# Patient Record
Sex: MALE | Race: WHITE | NOT HISPANIC OR LATINO | Employment: FULL TIME | URBAN - METROPOLITAN AREA
[De-identification: names, ages, dates, MRNs, and addresses within clinical notes are randomized per-mention and may not be internally consistent; named-entity substitution may affect disease eponyms.]

---

## 2020-05-15 ENCOUNTER — OFFICE VISIT (OUTPATIENT)
Dept: FAMILY MEDICINE CLINIC | Facility: CLINIC | Age: 20
End: 2020-05-15

## 2020-05-15 VITALS
SYSTOLIC BLOOD PRESSURE: 132 MMHG | HEIGHT: 72 IN | HEART RATE: 84 BPM | WEIGHT: 315 LBS | BODY MASS INDEX: 42.66 KG/M2 | DIASTOLIC BLOOD PRESSURE: 92 MMHG | RESPIRATION RATE: 20 BRPM | TEMPERATURE: 99.4 F

## 2020-05-15 DIAGNOSIS — R40.0 HAS DAYTIME DROWSINESS: ICD-10-CM

## 2020-05-15 DIAGNOSIS — E66.01 CLASS 3 SEVERE OBESITY DUE TO EXCESS CALORIES WITH BODY MASS INDEX (BMI) OF 50.0 TO 59.9 IN ADULT, UNSPECIFIED WHETHER SERIOUS COMORBIDITY PRESENT (HCC): ICD-10-CM

## 2020-05-15 DIAGNOSIS — Z13.6 ENCOUNTER FOR LIPID SCREENING FOR CARDIOVASCULAR DISEASE: ICD-10-CM

## 2020-05-15 DIAGNOSIS — Z13.220 ENCOUNTER FOR LIPID SCREENING FOR CARDIOVASCULAR DISEASE: ICD-10-CM

## 2020-05-15 DIAGNOSIS — R06.81 WITNESSED APNEIC SPELLS: ICD-10-CM

## 2020-05-15 DIAGNOSIS — R53.83 OTHER FATIGUE: ICD-10-CM

## 2020-05-15 DIAGNOSIS — R06.83 SNORING: Primary | ICD-10-CM

## 2020-05-15 DIAGNOSIS — R63.4 WEIGHT LOSS: ICD-10-CM

## 2020-05-15 PROCEDURE — 99203 OFFICE O/P NEW LOW 30 MIN: CPT | Performed by: FAMILY MEDICINE

## 2020-05-15 NOTE — PROGRESS NOTES
Assessment/Plan       Problems Addressed this Visit     None      Visit Diagnoses     Snoring    -  Primary    Relevant Orders    Home Sleep Study    Has daytime drowsiness        Relevant Orders    Home Sleep Study    Witnessed apneic spells        Relevant Orders    Home Sleep Study    Other fatigue        Relevant Orders    CBC & Differential    Weight loss        Relevant Orders    CBC & Differential    Hemoglobin A1c    Class 3 severe obesity due to excess calories with body mass index (BMI) of 50.0 to 59.9 in adult, unspecified whether serious comorbidity present (CMS/[facility])        Relevant Orders    Comprehensive Metabolic Panel    Lipid Panel With / Chol / HDL Ratio    Hemoglobin A1c    Encounter for lipid screening for cardiovascular disease        Relevant Orders    Comprehensive Metabolic Panel    Lipid Panel With / Chol / HDL Ratio            Follow up: Return if symptoms worsen or fail to improve.     DISCUSSION  New patient here for evaluation.  Has chronic problems which may be due to obstructive sleep apnea.  Reports snoring, daytime drowsiness and has had witnessed apneic episodes.  Recommend sleep study.    Weight loss with obesity.  Has been trying to lose weight but at great risk for diabetes.  Check A1c and CMP.    We will also check lipids for screening.    Mild elevation of blood pressure may be due to sleep apnea or obesity.  Check sleep study.  If blood pressure remains elevated, may need treatment.          MEDICATIONS PRESCRIBED  Requested Prescriptions      No prescriptions requested or ordered in this encounter            -------------------------------------------    Subjective     Chief Complaint   Patient presents with   • Establish Care     mom wanted him to be seen. Seen eye MD and something going on with Left eye they think related to sleep apnea? or having to wear contacts too long.    • check for diabetes     Checkup         History of Present Illness    Weight  "loss  Hyperglycemia?  ? DM   Needs to be checked  Losing weight on purpose  Has decreased portion size  FH: mom + DM and dad prediabetic  No urine at night  Some thirst  Not always hungry    High Blood pressure  Has been igh at MD office before  No meds for BP  FH: Mom + HTN    No chest pain   No shortness of breath  No headaches  Some swelling ankles if long walk      Snoring  Fiancé has said he stops breathing  + tired   No naps during the day    Occ fal asleep as passenger  No sleep driving    Wake up : if flat, + tired    Left eye: some blood vessels , ? Not enough oxygen. Either sleep apnea or over wear contacts  Takes out at night now to see if helps        Concerned about DM and high blood pressure      Previous ADD  Off meds since age 17    Denies depression  Last few days, some feeling down    Sons   before delivery. (1st son). 7 months for 1st one and then 8 months for 2nd child. 1 yr ago and the other 6 months        Social History     Tobacco Use   Smoking Status Former Smoker   • Years: 1.00   Smokeless Tobacco Never Used   Tobacco Comment    smoked some in HS          Past Medical History,Medications, Allergies, and social history was reviewed.          Review of Systems   Constitutional: Positive for fatigue and unexpected weight loss.   HENT: Negative.    Respiratory: Negative.    Cardiovascular: Negative.    Gastrointestinal: Negative.    Genitourinary: Negative.    Musculoskeletal: Negative.    Neurological: Negative.    Psychiatric/Behavioral: Negative.        Objective     Vitals:    05/15/20 1220   BP: 132/92   Pulse: 84   Resp: 20   Temp: 99.4 °F (37.4 °C)   Weight: (!) 174 kg (383 lb 12.8 oz)   Height: 182.9 cm (72\")          Physical Exam   Constitutional: Vital signs are normal. He appears well-developed and well-nourished.   Obese   HENT:   Head: Normocephalic and atraumatic.   Right Ear: Hearing, tympanic membrane, external ear and ear canal normal.   Left Ear: Hearing, tympanic " membrane, external ear and ear canal normal.   Mouth/Throat: Oropharynx is clear and moist.   Eyes: Pupils are equal, round, and reactive to light. Conjunctivae, EOM and lids are normal.   Neck: Normal range of motion. Neck supple. No thyromegaly present.   Cardiovascular: Normal rate, regular rhythm and normal heart sounds. Exam reveals no friction rub.   No murmur heard.  Pulmonary/Chest: Effort normal and breath sounds normal. No respiratory distress. He has no wheezes. He has no rales.   Abdominal: Soft. Normal appearance and bowel sounds are normal. He exhibits no distension and no mass. There is no tenderness. There is no rebound and no guarding.   Musculoskeletal: He exhibits no edema.   Neurological: He is alert. He has normal strength.   Skin: Skin is warm and dry.   Psychiatric: He has a normal mood and affect. His speech is normal. Cognition and memory are normal.   Nursing note and vitals reviewed.                Carlos Haywood MD

## 2020-05-16 LAB
ALBUMIN SERPL-MCNC: 4.4 G/DL (ref 4.1–5.2)
ALBUMIN/GLOB SERPL: 1.5 {RATIO} (ref 1.2–2.2)
ALP SERPL-CCNC: 97 IU/L (ref 39–117)
ALT SERPL-CCNC: 40 IU/L (ref 0–44)
AST SERPL-CCNC: 21 IU/L (ref 0–40)
BASOPHILS # BLD AUTO: 0.1 X10E3/UL (ref 0–0.2)
BASOPHILS NFR BLD AUTO: 1 %
BILIRUB SERPL-MCNC: 0.2 MG/DL (ref 0–1.2)
BUN SERPL-MCNC: 17 MG/DL (ref 6–20)
BUN/CREAT SERPL: 22 (ref 9–20)
CALCIUM SERPL-MCNC: 9.6 MG/DL (ref 8.7–10.2)
CHLORIDE SERPL-SCNC: 102 MMOL/L (ref 96–106)
CHOLEST SERPL-MCNC: 156 MG/DL (ref 100–199)
CHOLEST/HDLC SERPL: 4.1 RATIO (ref 0–5)
CO2 SERPL-SCNC: 23 MMOL/L (ref 20–29)
CREAT SERPL-MCNC: 0.77 MG/DL (ref 0.76–1.27)
EOSINOPHIL # BLD AUTO: 0.2 X10E3/UL (ref 0–0.4)
EOSINOPHIL NFR BLD AUTO: 2 %
ERYTHROCYTE [DISTWIDTH] IN BLOOD BY AUTOMATED COUNT: 13.3 % (ref 11.6–15.4)
GLOBULIN SER CALC-MCNC: 2.9 G/DL (ref 1.5–4.5)
GLUCOSE SERPL-MCNC: 79 MG/DL (ref 65–99)
HBA1C MFR BLD: 6 % (ref 4.8–5.6)
HCT VFR BLD AUTO: 44.7 % (ref 37.5–51)
HDLC SERPL-MCNC: 38 MG/DL
HGB BLD-MCNC: 14.8 G/DL (ref 13–17.7)
IMM GRANULOCYTES # BLD AUTO: 0 X10E3/UL (ref 0–0.1)
IMM GRANULOCYTES NFR BLD AUTO: 0 %
LDLC SERPL CALC-MCNC: 77 MG/DL (ref 0–99)
LYMPHOCYTES # BLD AUTO: 2.5 X10E3/UL (ref 0.7–3.1)
LYMPHOCYTES NFR BLD AUTO: 30 %
MCH RBC QN AUTO: 29 PG (ref 26.6–33)
MCHC RBC AUTO-ENTMCNC: 33.1 G/DL (ref 31.5–35.7)
MCV RBC AUTO: 88 FL (ref 79–97)
MONOCYTES # BLD AUTO: 0.8 X10E3/UL (ref 0.1–0.9)
MONOCYTES NFR BLD AUTO: 9 %
NEUTROPHILS # BLD AUTO: 4.9 X10E3/UL (ref 1.4–7)
NEUTROPHILS NFR BLD AUTO: 58 %
PLATELET # BLD AUTO: 405 X10E3/UL (ref 150–450)
POTASSIUM SERPL-SCNC: 4.3 MMOL/L (ref 3.5–5.2)
PROT SERPL-MCNC: 7.3 G/DL (ref 6–8.5)
RBC # BLD AUTO: 5.11 X10E6/UL (ref 4.14–5.8)
SODIUM SERPL-SCNC: 144 MMOL/L (ref 134–144)
TRIGL SERPL-MCNC: 206 MG/DL (ref 0–149)
VLDLC SERPL CALC-MCNC: 41 MG/DL (ref 5–40)
WBC # BLD AUTO: 8.5 X10E3/UL (ref 3.4–10.8)

## 2020-05-26 ENCOUNTER — HOSPITAL ENCOUNTER (OUTPATIENT)
Dept: SLEEP MEDICINE | Facility: HOSPITAL | Age: 20
Discharge: HOME | End: 2020-05-26
Admitting: FAMILY MEDICINE

## 2020-05-26 VITALS — WEIGHT: 315 LBS | HEIGHT: 72 IN | BODY MASS INDEX: 42.66 KG/M2

## 2020-05-26 DIAGNOSIS — R06.81 WITNESSED APNEIC SPELLS: ICD-10-CM

## 2020-05-26 DIAGNOSIS — R06.83 SNORING: ICD-10-CM

## 2020-05-26 DIAGNOSIS — R40.0 HAS DAYTIME DROWSINESS: ICD-10-CM

## 2020-05-26 PROCEDURE — 95800 SLP STDY UNATTENDED: CPT | Performed by: INTERNAL MEDICINE

## 2020-05-26 PROCEDURE — 95800 SLP STDY UNATTENDED: CPT

## 2020-06-01 ENCOUNTER — TELEPHONE (OUTPATIENT)
Dept: FAMILY MEDICINE CLINIC | Facility: CLINIC | Age: 20
End: 2020-06-01

## 2020-06-01 DIAGNOSIS — G47.33 OSA (OBSTRUCTIVE SLEEP APNEA): Primary | ICD-10-CM

## 2020-06-01 NOTE — TELEPHONE ENCOUNTER
[facility] IS CALLING STATING THAT THEY NEED THE NOTES FROM THE 5/15/20 VISIT, THE NOTES FROM THE SLEEP STUDY AND THE PATIENTS DEMOGRAPHICS FAXED OVER AS WELL. PLEASE ADVISE     FAX   [phone]

## 2020-06-01 NOTE — TELEPHONE ENCOUNTER
----- Message from Daryn Haywood sent at 6/1/2020  9:40 AM EDT -----  Regarding: RE: Prescription Question  Contact: [phone]  Narinder here in [place]   ----- Message -----  From: RAMONA LAINEZ  Sent: 6/1/20, 9:36 AM  To: Daryn Haywood  Subject: RE: Prescription Question    Scot,     Where would you like for your Cpap to be sent?    ----- Message -----     From: Daryn Haywood     Sent: 6/1/2020  9:34 AM EDT       To: Carlos Haywood MD  Subject: Prescription Question    Doc I like to switch where you send my script for my c-pat too please get back to me ASAP

## 2020-06-02 ENCOUNTER — TELEPHONE (OUTPATIENT)
Dept: FAMILY MEDICINE CLINIC | Facility: CLINIC | Age: 20
End: 2020-06-02

## 2020-06-02 NOTE — TELEPHONE ENCOUNTER
----- Message from Daryn Haywood sent at 6/2/2020 12:58 PM EDT -----  Regarding: RE: CPAP  Contact: [phone]  I have not received anything from Narinder.   ----- Message -----  From: Carlos Haywood MD  Sent: 6/1/20, 11:05 AM  To: Daryn Haywood  Subject: RE: CPAP    Will get that faxed to them. If you do not hear from them, let me know.     Carlos Haywood MD      ----- Message -----     From: Daryn Haywood     Sent: 6/1/2020 10:01 AM EDT       To: Carlos Haywood MD  Subject: RE: CPAP    Yes I do     ----- Message -----  From: Carlos Haywood MD  Sent: 6/1/20, 10:01 AM  To: Daryn Haywood  Subject: CPAP    So you want the CPAP to go to [facility]?     Carlos Haywood MD

## 2020-06-03 NOTE — TELEPHONE ENCOUNTER
They did get the order and contacted the patient and he is going to call them back when he decides when he can come in to their office to get set up.

## 2020-06-19 ENCOUNTER — OFFICE VISIT (OUTPATIENT)
Dept: FAMILY MEDICINE CLINIC | Facility: CLINIC | Age: 20
End: 2020-06-19

## 2020-06-19 VITALS
RESPIRATION RATE: 18 BRPM | SYSTOLIC BLOOD PRESSURE: 124 MMHG | HEART RATE: 76 BPM | HEIGHT: 72 IN | TEMPERATURE: 98.9 F | WEIGHT: 315 LBS | DIASTOLIC BLOOD PRESSURE: 78 MMHG | BODY MASS INDEX: 42.66 KG/M2

## 2020-06-19 DIAGNOSIS — R73.9 HYPERGLYCEMIA: ICD-10-CM

## 2020-06-19 DIAGNOSIS — E66.01 CLASS 3 SEVERE OBESITY DUE TO EXCESS CALORIES WITH BODY MASS INDEX (BMI) OF 50.0 TO 59.9 IN ADULT, UNSPECIFIED WHETHER SERIOUS COMORBIDITY PRESENT (HCC): ICD-10-CM

## 2020-06-19 DIAGNOSIS — G47.33 OSA (OBSTRUCTIVE SLEEP APNEA): Primary | ICD-10-CM

## 2020-06-19 PROCEDURE — 99213 OFFICE O/P EST LOW 20 MIN: CPT | Performed by: FAMILY MEDICINE

## 2020-06-19 NOTE — PROGRESS NOTES
Assessment/Plan       Problems Addressed this Visit     None      Visit Diagnoses     KATYA (obstructive sleep apnea)    -  Primary    Class 3 severe obesity due to excess calories with body mass index (BMI) of 50.0 to 59.9 in adult, unspecified whether serious comorbidity present (CMS/[facility])        Relevant Orders    Ambulatory Referral to Nutrition Services    Hyperglycemia        Relevant Orders    Ambulatory Referral to Nutrition Services            Follow up: Return in about 1 month (around 7/19/2020).     DISCUSSION  Obstructive sleep apnea.  Continue CPAP.  Doing well.    Obesity.  Has been gaining weight in spite of dieting and exercising.  Refer to dietitian.  Follow-up and recheck in 1 month.    Hyperglycemia.  Last A1c 6.0.  Risk for diabetes.  Refer to dietitian.          MEDICATIONS PRESCRIBED  Requested Prescriptions      No prescriptions requested or ordered in this encounter              -------------------------------------------    Subjective     Chief Complaint   Patient presents with   • Weight Gain     Trying to lose weight but not having any success      Answers for HPI/ROS submitted by the patient on 6/14/2020   What is the primary reason for your visit?: Other  Please describe your symptoms.: Diet  Have you had these symptoms before?: No  How long have you been having these symptoms?: 1-4 days      History of Present Illness    Obstructive sleep apnea  Had AHI of 40  Prescription was sent for CPAP.  Got CPAP  Feels better since using  Using all night most night  Tolerate mask ( under the nose ) hose from the top          Obesity  He has been trying to lose weight but not been successful.     Weight is increased 8 pounds since last visit.    Current diet: feels like eating less and eating more veggies   -- yesterday: missed breakfast, lunch : wooden straw , healthy shake,   Dinner: grilled chicken and mashed potatoes. Water in the evening. Drank 2 vanilla cokes ( not diet)    No snacks    This  "am, this am chicken sandwich, potato wedges    Did overeat previously    Since last visit has been eating better and exercising      Had seen dietician ( while ago)    Exercise: has been going to the [facility], walk and sprint    Previous medications tried:        ====================                Social History     Tobacco Use   Smoking Status Former Smoker   • Years: 1.00   Smokeless Tobacco Never Used   Tobacco Comment    smoked some in HS          Past Medical History,Medications, Allergies, and social history was reviewed.          Review of Systems   Constitutional: Positive for unexpected weight gain.   HENT: Negative.    Respiratory: Negative.    Cardiovascular: Negative.    Gastrointestinal: Negative.    Musculoskeletal: Negative.    Neurological: Negative.    Psychiatric/Behavioral: Negative.        Objective     Vitals:    06/19/20 1050   BP: 124/78   Pulse: 76   Resp: 18   Temp: 98.9 °F (37.2 °C)   Weight: (!) 177 kg (391 lb)   Height: 182.9 cm (72\")          Physical Exam   Constitutional: He appears well-developed and well-nourished.   obese   HENT:   Head: Normocephalic and atraumatic.   Right Ear: External ear normal.   Left Ear: External ear normal.   Eyes: Pupils are equal, round, and reactive to light. EOM are normal.   Cardiovascular: Normal rate, regular rhythm and normal heart sounds.   Pulmonary/Chest: Effort normal and breath sounds normal. No respiratory distress. He has no wheezes. He has no rales.   Neurological: He is alert.   Skin: Skin is warm and dry.   Psychiatric: He has a normal mood and affect.   Nursing note and vitals reviewed.                Carlos Haywood MD    "

## 2020-07-09 ENCOUNTER — HOSPITAL ENCOUNTER (OUTPATIENT)
Dept: DIABETES SERVICES | Facility: HOSPITAL | Age: 20
Setting detail: RECURRING SERIES
Discharge: HOME | End: 2020-07-09

## 2020-08-11 ENCOUNTER — HOSPITAL ENCOUNTER (OUTPATIENT)
Dept: DIABETES SERVICES | Facility: HOSPITAL | Age: 20
Setting detail: RECURRING SERIES
Discharge: HOME | End: 2020-08-11

## 2020-08-11 NOTE — CONSULTS
DIABETES EDUCATION CONSULT, 30 minutes  This medical referred consult was provided as a telephone call, tele-health or e-visit, as patient is unable to attend an in-office appointment due to the COVID-19 crisis. Consent for treatment was given verbally.Please see media tab for assessment and notes if you use [facility]. If you are not an [employer] user a copy of patient's assessment and notes will be sent per routine. Thank you.

## 2020-10-16 ENCOUNTER — OFFICE VISIT (OUTPATIENT)
Dept: FAMILY MEDICINE CLINIC | Facility: CLINIC | Age: 20
End: 2020-10-16

## 2020-10-16 VITALS
HEIGHT: 72 IN | RESPIRATION RATE: 18 BRPM | BODY MASS INDEX: 42.66 KG/M2 | SYSTOLIC BLOOD PRESSURE: 128 MMHG | WEIGHT: 315 LBS | TEMPERATURE: 98.1 F | DIASTOLIC BLOOD PRESSURE: 90 MMHG | HEART RATE: 80 BPM

## 2020-10-16 DIAGNOSIS — R09.81 NASAL CONGESTION: Primary | ICD-10-CM

## 2020-10-16 DIAGNOSIS — R63.5 WEIGHT GAIN: ICD-10-CM

## 2020-10-16 DIAGNOSIS — E66.01 CLASS 3 SEVERE OBESITY DUE TO EXCESS CALORIES WITHOUT SERIOUS COMORBIDITY WITH BODY MASS INDEX (BMI) OF 50.0 TO 59.9 IN ADULT (HCC): ICD-10-CM

## 2020-10-16 PROCEDURE — 99214 OFFICE O/P EST MOD 30 MIN: CPT | Performed by: FAMILY MEDICINE

## 2020-10-16 RX ORDER — FLUTICASONE PROPIONATE 50 MCG
2 SPRAY, SUSPENSION (ML) NASAL DAILY
Qty: 16 G | Refills: 2 | Status: SHIPPED | OUTPATIENT
Start: 2020-10-16 | End: 2021-01-18

## 2020-10-16 RX ORDER — PHENTERMINE HYDROCHLORIDE 37.5 MG/1
TABLET ORAL
Qty: 30 TABLET | Refills: 0 | Status: SHIPPED | OUTPATIENT
Start: 2020-10-16 | End: 2020-11-16 | Stop reason: SDUPTHER

## 2020-10-16 NOTE — PROGRESS NOTES
Assessment/Plan       Problems Addressed this Visit     None      Visit Diagnoses     Weight gain    -  Primary    Relevant Orders    TSH    Class 3 severe obesity due to excess calories without serious comorbidity with body mass index (BMI) of 50.0 to 59.9 in adult (CMS/[facility])        Relevant Medications    phentermine (ADIPEX-P) 37.5 MG tablet    Nasal congestion        Relevant Medications    fluticasone (Flonase) 50 MCG/ACT nasal spray      Diagnoses       Codes Comments    Weight gain    -  Primary ICD-10-CM: R63.5  ICD-9-CM: 783.1     Class 3 severe obesity due to excess calories without serious comorbidity with body mass index (BMI) of 50.0 to 59.9 in adult (CMS/[facility])     ICD-10-CM: E66.01, Z68.43  ICD-9-CM: 278.01, V85.43     Nasal congestion     ICD-10-CM: R09.81  ICD-9-CM: 478.19             Follow up: Return in about 1 month (around 11/16/2020).     DISCUSSION  Nasal congestion.  Chronic intermittent.  Trial of Flonase.  Side effects explained.  Explained that he must use it for at least 2 to 3 weeks to see if is going to be effective.  Call if not helping.    Weight gain with obesity.  Most likely due to excess calories.  Discussed options including diet, increasing activity and exercise.  Recommend decrease carbohydrates including sweet tea, bread, pasta and snacks.  He expressed understanding.  He would like to try medication in addition to this.    I discussed with the patient about options for weight loss including phentermine. Side effects of phentermine including increased heart rate, arrhythmia, heart valve disorder, elevated blood pressure inside of lungs or pulmonary hypertension, abuse and dependence was all explained. I also explained that it was expected a follow-up would be in one month after starting the medication to be sure weight loss is occurring. Patient expressed understanding.     Follow-up in 1 month.          MEDICATIONS PRESCRIBED  Requested Prescriptions     Signed Prescriptions  Disp Refills   • phentermine (ADIPEX-P) 37.5 MG tablet 30 tablet 0     Si/2 po daily for 6 days then one po daily   • fluticasone (Flonase) 50 MCG/ACT nasal spray 16 g 2     Si sprays into the nostril(s) as directed by provider Daily.            Fawad dated on 10/16/2020  was reviewed and appropriate.        -------------------------------------------    Subjective     Chief Complaint   Patient presents with   • Sinus Problem   • talk about weight.         Sinus Problem  This is a recurrent (worse this year. weather changes, hard to use cpap, feels normal now) problem. The current episode started more than 1 month ago. The problem has been waxing and waning since onset. There has been no fever. Associated symptoms include congestion (, green color at times , no color now), coughing and a sore throat. Pertinent negatives include no ear pain ( pressure), headaches, hoarse voice, neck pain, shortness of breath or swollen glands. Treatments tried: advil sinus. The treatment provided mild relief.       No nasal prays tried    Obesity  Increased weight  Diet has changed. Eating more.   Interested in taking med for this  No med for wt loss in the past  Exercise: walk 1 mi daily    Diet: sweet tea ( with every meal). ++ bread, + chips and snack foods.           Social History     Tobacco Use   Smoking Status Former Smoker   • Years: 1.00   Smokeless Tobacco Never Used   Tobacco Comment    smoked some in HS          Past Medical History,Medications, Allergies, and social history was reviewed.          Review of Systems   HENT: Positive for congestion (, green color at times , no color now), sore throat and trouble swallowing. Negative for drooling, ear discharge, ear pain ( pressure), hoarse voice and swollen glands.    Respiratory: Positive for cough and stridor. Negative for shortness of breath.    Gastrointestinal: Negative for abdominal pain, diarrhea and vomiting.   Musculoskeletal: Negative for neck pain.  "  Neurological: Negative.    Psychiatric/Behavioral: Negative.        Objective     Vitals:    10/16/20 0849 10/16/20 0910   BP: 142/92 128/90   Pulse: 80    Resp: 18    Temp: 98.1 °F (36.7 °C)    Weight: (!) 179 kg (395 lb)    Height: 182.9 cm (72\")           Physical Exam  Vitals signs and nursing note reviewed.   Constitutional:       Appearance: Normal appearance. He is well-developed. He is obese.   HENT:      Head: Normocephalic and atraumatic.      Right Ear: Hearing, ear canal and external ear normal. Tympanic membrane is scarred. Tympanic membrane is not erythematous or retracted.      Left Ear: Hearing, ear canal and external ear normal. Tympanic membrane is scarred. Tympanic membrane is not erythematous or retracted.   Eyes:      General: Lids are normal.      Conjunctiva/sclera: Conjunctivae normal.      Pupils: Pupils are equal, round, and reactive to light.   Neck:      Musculoskeletal: Normal range of motion and neck supple.      Thyroid: No thyromegaly.   Cardiovascular:      Rate and Rhythm: Normal rate and regular rhythm.      Heart sounds: Normal heart sounds. No murmur. No friction rub.   Pulmonary:      Effort: Pulmonary effort is normal. No respiratory distress.      Breath sounds: Normal breath sounds. No wheezing or rales.   Skin:     General: Skin is warm and dry.   Neurological:      Mental Status: He is alert.   Psychiatric:         Speech: Speech normal.                     Carlos Haywood MD    "

## 2020-10-17 LAB — TSH SERPL DL<=0.005 MIU/L-ACNC: 2.68 UIU/ML (ref 0.27–4.2)

## 2020-11-16 ENCOUNTER — OFFICE VISIT (OUTPATIENT)
Dept: FAMILY MEDICINE CLINIC | Facility: CLINIC | Age: 20
End: 2020-11-16

## 2020-11-16 VITALS
BODY MASS INDEX: 42.66 KG/M2 | TEMPERATURE: 98.2 F | RESPIRATION RATE: 18 BRPM | WEIGHT: 315 LBS | HEIGHT: 72 IN | DIASTOLIC BLOOD PRESSURE: 84 MMHG | HEART RATE: 80 BPM | SYSTOLIC BLOOD PRESSURE: 132 MMHG

## 2020-11-16 DIAGNOSIS — E66.01 CLASS 3 SEVERE OBESITY DUE TO EXCESS CALORIES WITHOUT SERIOUS COMORBIDITY WITH BODY MASS INDEX (BMI) OF 50.0 TO 59.9 IN ADULT (HCC): ICD-10-CM

## 2020-11-16 PROCEDURE — 99213 OFFICE O/P EST LOW 20 MIN: CPT | Performed by: FAMILY MEDICINE

## 2020-11-16 RX ORDER — PHENTERMINE HYDROCHLORIDE 37.5 MG/1
37.5 TABLET ORAL
Qty: 30 TABLET | Refills: 1 | Status: SHIPPED | OUTPATIENT
Start: 2020-11-16 | End: 2021-01-18 | Stop reason: SDUPTHER

## 2020-11-16 NOTE — PROGRESS NOTES
Assessment/Plan       Diagnoses and all orders for this visit:    1. Class 3 severe obesity due to excess calories without serious comorbidity with body mass index (BMI) of 50.0 to 59.9 in adult (CMS/[facility])  -     phentermine (ADIPEX-P) 37.5 MG tablet; Take 1 tablet by mouth Every Morning Before Breakfast.  Dispense: 30 tablet; Refill: 1           Follow up: Return in about 2 months (around 1/16/2021).     DISCUSSION  Doing well. Lost 17 pounds 1st month. Continue diet changes and increase activity. Follow up in 2 months. Wt loss goal would be 20 pounds in the next 2 months.     Refilled med.           MEDICATIONS PRESCRIBED  Requested Prescriptions     Signed Prescriptions Disp Refills   • phentermine (ADIPEX-P) 37.5 MG tablet 30 tablet 1     Sig: Take 1 tablet by mouth Every Morning Before Breakfast.            Fawad dated on 11/16/2020   was reviewed and appropriate.        -------------------------------------------    Subjective     Chief Complaint   Patient presents with   • Weight Check         History of Present Illness    Obesity  Doing well. No issues. Losing weight.   On phentermine. 1 month of 6   Chest pain: no  Shortness of breath: no  Palpitations : no  Edema: no  Dry Mouth: yes    Np constipation  Increased water      Diet: decreased portions. Feels like full more with med. Decreased carbs and sweet tea.     Exercise: 10 min walk when able           Social History     Tobacco Use   Smoking Status Former Smoker   • Years: 1.00   Smokeless Tobacco Never Used   Tobacco Comment    smoked some in HS          Past Medical History,Medications, Allergies, and social history was reviewed.          Review of Systems   Constitutional: Negative.    HENT: Negative.    Respiratory: Negative.    Cardiovascular: Negative.    Gastrointestinal: Negative.    Neurological: Negative.    Psychiatric/Behavioral: Negative.        Objective     Vitals:    11/16/20 0901 11/16/20 0913   BP: 144/88 132/84   Pulse: 80    Resp:  "18    Temp: 98.2 °F (36.8 °C)    Weight: (!) 171 kg (378 lb)    Height: 182.9 cm (72\")           Physical Exam  Vitals signs and nursing note reviewed.   Constitutional:       General: He is not in acute distress.     Appearance: He is well-developed. He is obese.   HENT:      Head: Normocephalic and atraumatic.      Right Ear: External ear normal.      Left Ear: External ear normal.   Eyes:      Pupils: Pupils are equal, round, and reactive to light.   Cardiovascular:      Rate and Rhythm: Normal rate and regular rhythm.      Heart sounds: Normal heart sounds.   Pulmonary:      Effort: Pulmonary effort is normal. No respiratory distress.      Breath sounds: Normal breath sounds. No wheezing or rales.   Musculoskeletal:      Right lower leg: No edema.      Left lower leg: No edema.   Skin:     General: Skin is warm and dry.   Neurological:      Mental Status: He is alert. Mental status is at baseline.   Psychiatric:         Behavior: Behavior normal.                     Carlos Haywood MD    "

## 2021-01-18 ENCOUNTER — OFFICE VISIT (OUTPATIENT)
Dept: FAMILY MEDICINE CLINIC | Facility: CLINIC | Age: 21
End: 2021-01-18

## 2021-01-18 VITALS
RESPIRATION RATE: 18 BRPM | WEIGHT: 315 LBS | HEART RATE: 78 BPM | SYSTOLIC BLOOD PRESSURE: 126 MMHG | HEIGHT: 72 IN | DIASTOLIC BLOOD PRESSURE: 82 MMHG | BODY MASS INDEX: 42.66 KG/M2 | TEMPERATURE: 97.7 F

## 2021-01-18 DIAGNOSIS — R07.89 CHEST WALL PAIN: Primary | ICD-10-CM

## 2021-01-18 DIAGNOSIS — E66.01 CLASS 3 SEVERE OBESITY DUE TO EXCESS CALORIES WITHOUT SERIOUS COMORBIDITY WITH BODY MASS INDEX (BMI) OF 45.0 TO 49.9 IN ADULT (HCC): ICD-10-CM

## 2021-01-18 PROCEDURE — 99213 OFFICE O/P EST LOW 20 MIN: CPT | Performed by: FAMILY MEDICINE

## 2021-01-18 RX ORDER — PHENTERMINE HYDROCHLORIDE 37.5 MG/1
37.5 TABLET ORAL
Qty: 30 TABLET | Refills: 1 | OUTPATIENT
Start: 2021-01-18 | End: 2021-08-25

## 2021-01-18 NOTE — PROGRESS NOTES
Assessment/Plan       Diagnoses and all orders for this visit:    1. Chest wall pain (Primary)    2. Class 3 severe obesity due to excess calories without serious comorbidity with body mass index (BMI) of 45.0 to 49.9 in adult (CMS/[facility])  -     phentermine (ADIPEX-P) 37.5 MG tablet; Take 1 tablet by mouth Every Morning Before Breakfast.  Dispense: 30 tablet; Refill: 1           Follow up: Return in about 2 months (around 3/18/2021).     DISCUSSION  Suspect chest wall pain is muscular. Can try ibuprofen. Ok to continue meds    Since burping helps, may be gas component as well so can try OTC Gas X    Obesity. Total wt loss 27 pounds on med. Continue phentermine 37.5 mg daily. Continue diet and exercise. Goal is 15 pounds.         MEDICATIONS PRESCRIBED  Requested Prescriptions     Signed Prescriptions Disp Refills   • phentermine (ADIPEX-P) 37.5 MG tablet 30 tablet 1     Sig: Take 1 tablet by mouth Every Morning Before Breakfast.            Fawad dated on 1/18/2021   was reviewed and appropriate.        -------------------------------------------    Subjective     Chief Complaint   Patient presents with   • Obesity     2 mo f/u         History of Present Illness    Chest pain  Occ pain under the left breast. Hurt worse with lifting heavier. Stopped the med and was concerned that it was the med.   Hurts to eat at times. Comes and goes.   Burping helps.     Pain comes in waves  Obesity  Has been moving more and working more  Diet better. Eating healthier. More salads.     Obesity  Doing well. No issues. Losing weight.   On phentermine. 3 month of 6   Chest pain: in between chest and LUQ  Shortness of breath: no  Palpitations : no  Edema: no  Dry Mouth: no    Diet: see above  Exercise: See above             Social History     Tobacco Use   Smoking Status Former Smoker   • Years: 1.00   Smokeless Tobacco Never Used   Tobacco Comment    smoked some in HS          Past Medical History,Medications, Allergies, and social  "history was reviewed.          Review of Systems   Constitutional: Negative.    HENT: Negative.    Respiratory: Negative.  Negative for shortness of breath.    Cardiovascular: Positive for chest pain (see HPI).   Gastrointestinal: Negative.    Musculoskeletal: Negative.         Chest wall pain. See above   Psychiatric/Behavioral: Negative.        Objective     Vitals:    01/18/21 1025   BP: 126/82   Pulse: 78   Resp: 18   Temp: 97.7 °F (36.5 °C)   Weight: (!) 167 kg (368 lb 6.4 oz)   Height: 182.9 cm (72\")      Body mass index is 49.96 kg/m².      Physical Exam  Vitals signs and nursing note reviewed.   Constitutional:       General: He is not in acute distress.     Appearance: He is well-developed. He is obese. He is not ill-appearing.   HENT:      Head: Normocephalic and atraumatic.      Right Ear: Hearing, tympanic membrane, ear canal and external ear normal.      Left Ear: Hearing, tympanic membrane, ear canal and external ear normal.      Nose: Nose normal. No congestion or rhinorrhea.      Mouth/Throat:      Mouth: Mucous membranes are moist.      Pharynx: No oropharyngeal exudate or posterior oropharyngeal erythema.   Eyes:      General: Lids are normal.      Conjunctiva/sclera: Conjunctivae normal.      Pupils: Pupils are equal, round, and reactive to light.   Neck:      Musculoskeletal: Normal range of motion and neck supple.      Thyroid: No thyromegaly.   Cardiovascular:      Rate and Rhythm: Normal rate and regular rhythm.      Heart sounds: Normal heart sounds. No murmur. No friction rub.      Comments: Chest wall tenderness left anterior  Pulmonary:      Effort: Pulmonary effort is normal. No respiratory distress.      Breath sounds: Normal breath sounds. No wheezing or rales.   Abdominal:      General: Bowel sounds are normal. There is no distension.      Palpations: Abdomen is soft. There is no mass.      Tenderness: There is no abdominal tenderness. There is no guarding or rebound. "   Musculoskeletal:      Right lower leg: No edema.      Left lower leg: No edema.   Skin:     General: Skin is warm and dry.   Neurological:      General: No focal deficit present.      Mental Status: He is alert.   Psychiatric:         Mood and Affect: Mood normal.         Speech: Speech normal.         Behavior: Behavior normal.                   Carlos Haywood MD

## 2021-08-25 ENCOUNTER — HOSPITAL ENCOUNTER (EMERGENCY)
Facility: HOSPITAL | Age: 21
Discharge: HOME | End: 2021-08-25
Attending: EMERGENCY MEDICINE | Admitting: EMERGENCY MEDICINE

## 2021-08-25 ENCOUNTER — APPOINTMENT (OUTPATIENT)
Dept: CT IMAGING | Facility: HOSPITAL | Age: 21
End: 2021-08-25

## 2021-08-25 VITALS
HEIGHT: 72 IN | BODY MASS INDEX: 42.66 KG/M2 | RESPIRATION RATE: 18 BRPM | OXYGEN SATURATION: 98 % | WEIGHT: 315 LBS | TEMPERATURE: 97.9 F | SYSTOLIC BLOOD PRESSURE: 154 MMHG | DIASTOLIC BLOOD PRESSURE: 95 MMHG | HEART RATE: 85 BPM

## 2021-08-25 DIAGNOSIS — K76.0 HEPATIC STEATOSIS: ICD-10-CM
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
## 2021-08-25 DIAGNOSIS — R10.9 LEFT SIDED ABDOMINAL PAIN: Primary | ICD-10-CM

## 2021-08-25 DIAGNOSIS — S39.011A STRAIN OF ABDOMINAL WALL, INITIAL ENCOUNTER: ICD-10-CM

## 2021-08-25 DIAGNOSIS — Z86.39 HISTORY OF OBESITY: ICD-10-CM

## 2021-08-25 LAB
ALBUMIN SERPL-MCNC: 4.2 G/DL (ref 3.5–5.2)
ALBUMIN/GLOB SERPL: 1.3 G/DL
ALP SERPL-CCNC: 86 U/L (ref 39–117)
ALT SERPL W P-5'-P-CCNC: 29 U/L (ref 1–41)
ANION GAP SERPL CALCULATED.3IONS-SCNC: 11 MMOL/L (ref 5–15)
AST SERPL-CCNC: 19 U/L (ref 1–40)
BASOPHILS # BLD AUTO: 0.07 10*3/MM3 (ref 0–0.2)
BASOPHILS NFR BLD AUTO: 0.6 % (ref 0–1.5)
BILIRUB SERPL-MCNC: 0.3 MG/DL (ref 0–1.2)
BILIRUB UR QL STRIP: NEGATIVE
BUN SERPL-MCNC: 16 MG/DL (ref 6–20)
BUN/CREAT SERPL: 21.9 (ref 7–25)
CALCIUM SPEC-SCNC: 9.5 MG/DL (ref 8.6–10.5)
CHLORIDE SERPL-SCNC: 104 MMOL/L (ref 98–107)
CLARITY UR: CLEAR
CO2 SERPL-SCNC: 24 MMOL/L (ref 22–29)
COLOR UR: YELLOW
CREAT SERPL-MCNC: 0.73 MG/DL (ref 0.76–1.27)
DEPRECATED RDW RBC AUTO: 41 FL (ref 37–54)
EOSINOPHIL # BLD AUTO: 0.32 10*3/MM3 (ref 0–0.4)
EOSINOPHIL NFR BLD AUTO: 2.9 % (ref 0.3–6.2)
ERYTHROCYTE [DISTWIDTH] IN BLOOD BY AUTOMATED COUNT: 13.1 % (ref 12.3–15.4)
GFR SERPL CREATININE-BSD FRML MDRD: 134 ML/MIN/1.73
GLOBULIN UR ELPH-MCNC: 3.3 GM/DL
GLUCOSE SERPL-MCNC: 94 MG/DL (ref 65–99)
GLUCOSE UR STRIP-MCNC: NEGATIVE MG/DL
HCT VFR BLD AUTO: 43.2 % (ref 37.5–51)
HGB BLD-MCNC: 14.5 G/DL (ref 13–17.7)
HGB UR QL STRIP.AUTO: NEGATIVE
HOLD SPECIMEN: NORMAL
IMM GRANULOCYTES # BLD AUTO: 0.03 10*3/MM3 (ref 0–0.05)
IMM GRANULOCYTES NFR BLD AUTO: 0.3 % (ref 0–0.5)
KETONES UR QL STRIP: ABNORMAL
LEUKOCYTE ESTERASE UR QL STRIP.AUTO: NEGATIVE
LIPASE SERPL-CCNC: 26 U/L (ref 13–60)
LYMPHOCYTES # BLD AUTO: 3.47 10*3/MM3 (ref 0.7–3.1)
LYMPHOCYTES NFR BLD AUTO: 31.4 % (ref 19.6–45.3)
MCH RBC QN AUTO: 29.2 PG (ref 26.6–33)
MCHC RBC AUTO-ENTMCNC: 33.6 G/DL (ref 31.5–35.7)
MCV RBC AUTO: 86.9 FL (ref 79–97)
MONOCYTES # BLD AUTO: 0.88 10*3/MM3 (ref 0.1–0.9)
MONOCYTES NFR BLD AUTO: 8 % (ref 5–12)
NEUTROPHILS NFR BLD AUTO: 56.8 % (ref 42.7–76)
NEUTROPHILS NFR BLD AUTO: 6.27 10*3/MM3 (ref 1.7–7)
NITRITE UR QL STRIP: NEGATIVE
NRBC BLD AUTO-RTO: 0 /100 WBC (ref 0–0.2)
PH UR STRIP.AUTO: 5.5 [PH] (ref 5–8)
PLATELET # BLD AUTO: 395 10*3/MM3 (ref 140–450)
PMV BLD AUTO: 9.4 FL (ref 6–12)
POTASSIUM SERPL-SCNC: 4.1 MMOL/L (ref 3.5–5.2)
PROT SERPL-MCNC: 7.5 G/DL (ref 6–8.5)
PROT UR QL STRIP: NEGATIVE
RBC # BLD AUTO: 4.97 10*6/MM3 (ref 4.14–5.8)
SODIUM SERPL-SCNC: 139 MMOL/L (ref 136–145)
SP GR UR STRIP: 1.03 (ref 1–1.03)
UROBILINOGEN UR QL STRIP: ABNORMAL
WBC # BLD AUTO: 11.04 10*3/MM3 (ref 3.4–10.8)
WHOLE BLOOD HOLD SPECIMEN: NORMAL

## 2021-08-25 PROCEDURE — 99283 EMERGENCY DEPT VISIT LOW MDM: CPT

## 2021-08-25 PROCEDURE — 85025 COMPLETE CBC W/AUTO DIFF WBC: CPT

## 2021-08-25 PROCEDURE — 83690 ASSAY OF LIPASE: CPT

## 2021-08-25 PROCEDURE — 74177 CT ABD & PELVIS W/CONTRAST: CPT

## 2021-08-25 PROCEDURE — 25010000002 ONDANSETRON PER 1 MG: Performed by: EMERGENCY MEDICINE

## 2021-08-25 PROCEDURE — 81003 URINALYSIS AUTO W/O SCOPE: CPT

## 2021-08-25 PROCEDURE — 25010000002 IOPAMIDOL 61 % SOLUTION: Performed by: EMERGENCY MEDICINE

## 2021-08-25 PROCEDURE — 96374 THER/PROPH/DIAG INJ IV PUSH: CPT

## 2021-08-25 PROCEDURE — 80053 COMPREHEN METABOLIC PANEL: CPT

## 2021-08-25 PROCEDURE — 25010000002 MORPHINE PER 10 MG: Performed by: EMERGENCY MEDICINE

## 2021-08-25 PROCEDURE — 96375 TX/PRO/DX INJ NEW DRUG ADDON: CPT

## 2021-08-25 RX ORDER — SODIUM CHLORIDE 9 MG/ML
10 INJECTION INTRAVENOUS AS NEEDED
Status: DISCONTINUED | OUTPATIENT
Start: 2021-08-25 | End: 2021-08-26 | Stop reason: HOSPADM

## 2021-08-25 RX ORDER — MORPHINE SULFATE 2 MG/ML
2 INJECTION, SOLUTION INTRAMUSCULAR; INTRAVENOUS ONCE
Status: COMPLETED | OUTPATIENT
Start: 2021-08-25 | End: 2021-08-25

## 2021-08-25 RX ORDER — ONDANSETRON 2 MG/ML
4 INJECTION INTRAMUSCULAR; INTRAVENOUS ONCE
Status: COMPLETED | OUTPATIENT
Start: 2021-08-25 | End: 2021-08-25

## 2021-08-25 RX ADMIN — ONDANSETRON 4 MG: 2 INJECTION INTRAMUSCULAR; INTRAVENOUS at 19:59

## 2021-08-25 RX ADMIN — SODIUM CHLORIDE 1000 ML: 9 INJECTION, SOLUTION INTRAVENOUS at 19:58

## 2021-08-25 RX ADMIN — MORPHINE SULFATE 2 MG: 2 INJECTION, SOLUTION INTRAMUSCULAR; INTRAVENOUS at 19:58

## 2021-08-25 RX ADMIN — IOPAMIDOL 100 ML: 612 INJECTION, SOLUTION INTRAVENOUS at 20:16

## 2021-08-25 NOTE — ED PROVIDER NOTES
Subjective   This is a 21-year-old male that presents to the ER with left lower quadrant abdominal pain that started earlier this morning.  Patient describes it as sharp and worsened with movement and position changes.  Patient says that he and his wife moved a couple of loveseat and boxes into their storage unit last evening.  Abdominal pain started this morning.  Patient had a normal bowel movement this morning.  Patient says that he vomited once while at work because he felt like he got overheated.  He denies any nausea since and actually feels hungry..  He denies any dysuria, urgency, or frequency.  He denies any previous abdominal surgeries.  He denies any fever or chills.  Past medical history is essentially negative except for morbid obesity with BMI of 51.  No other concerns at this time.      History provided by:  Patient  Abdominal Pain  Pain location:  LLQ  Pain quality: sharp    Pain radiates to:  Does not radiate  Onset quality:  Gradual  Duration: earlier this morning pain started.  Timing:  Constant  Progression:  Worsening  Chronicity:  New  Context: not alcohol use, not previous surgeries, not sick contacts and not suspicious food intake    Context comment:  Pt moved a couple of love seats and boxes into his storage unit last pm.  Onset of LLQ abd pain starting this morning and worsening throughout the day.  Nausea/vomiting x 1 today.  Normal BM this morning.  No dysuria/urgency, frequency.  No fever/chills.  Relieved by:  Lying down (Lying supine)  Worsened by:  Movement and position changes (Sitting or standing worsen symptoms)  Ineffective treatments:  NSAIDs  Associated symptoms: vomiting (Emesis x 1 at work.)    Associated symptoms: no anorexia, no chest pain, no chills, no constipation (Normal BM this morning.), no cough, no diarrhea, no dysuria, no fatigue, no fever, no flatus, no hematochezia, no hematuria, no melena, no nausea and no shortness of breath    Risk factors: no alcohol abuse and  has not had multiple surgeries    Risk factors comment:  No history of prior similar symptoms.  No history of abdominal surgeries or colonoscopy.      Review of Systems   Constitutional: Positive for appetite change. Negative for activity change, chills, diaphoresis, fatigue and fever.   HENT: Negative.    Respiratory: Negative.  Negative for cough, chest tightness and shortness of breath.    Cardiovascular: Negative.  Negative for chest pain, palpitations and leg swelling.   Gastrointestinal: Positive for abdominal pain (LLQ) and vomiting (Emesis x 1 at work.). Negative for abdominal distention, anorexia, blood in stool, constipation (Normal BM this morning.), diarrhea, flatus, hematochezia, melena and nausea.   Genitourinary: Negative.  Negative for dysuria, flank pain, frequency, hematuria and urgency.   Musculoskeletal: Negative.  Negative for back pain.   Neurological: Negative.    All other systems reviewed and are negative.      History reviewed. No pertinent past medical history.    No Known Allergies    History reviewed. No pertinent surgical history.    Family History   Problem Relation Age of Onset   • Diabetes Mother    • Hypertension Mother        Social History     Socioeconomic History   • Marital status: Single     Spouse name: Not on file   • Number of children: Not on file   • Years of education: Not on file   • Highest education level: Not on file   Tobacco Use   • Smoking status: Former Smoker     Years: 1.00   • Smokeless tobacco: Never Used   • Tobacco comment: smoked some in HS           Objective   Physical Exam  Vitals and nursing note reviewed.   Constitutional:       Appearance: Normal appearance.   HENT:      Head: Normocephalic and atraumatic.      Right Ear: Tympanic membrane normal.      Left Ear: Tympanic membrane normal.      Nose: Nose normal.      Mouth/Throat:      Mouth: Mucous membranes are moist.      Pharynx: Oropharynx is clear.   Eyes:      Extraocular Movements: Extraocular  movements intact.      Conjunctiva/sclera: Conjunctivae normal.      Pupils: Pupils are equal, round, and reactive to light.   Cardiovascular:      Rate and Rhythm: Normal rate and regular rhythm.  No extrasystoles are present.     Pulses: Normal pulses.      Heart sounds: Normal heart sounds.      Comments: Regular rate and rhythm.  No ectopy.  No pedal edema to lower extremities.  Pulmonary:      Effort: Pulmonary effort is normal.      Breath sounds: Normal breath sounds.      Comments: Lungs are clear to auscultation bilaterally.  Abdominal:      General: Bowel sounds are normal. There is no distension.      Palpations: Abdomen is soft.      Tenderness: There is abdominal tenderness in the left lower quadrant. There is no right CVA tenderness, left CVA tenderness, guarding or rebound.      Comments: Central obesity.  Soft with active bowel sounds.  No distention.  Tenderness to the left lower quadrant and left mid abdomen.  No rebound or guarding.  No CVA tenderness.  Abdominal exam is benign and nonsurgical.   Musculoskeletal:         General: Normal range of motion.      Cervical back: Normal range of motion and neck supple.      Right lower leg: No edema.      Left lower leg: No edema.   Skin:     General: Skin is warm and dry.   Neurological:      General: No focal deficit present.      Mental Status: He is alert.         Procedures           ED Course  ED Course as of Aug 25 2237   Wed Aug 25, 2021   2234 CBC and chemistries were essentially normal.  Lipase is 26.  Urinalysis reveals trace ketones but no acute infectious process.  CT of the abdomen/pelvis with contrast reveals moderate diffuse fatty infiltration of liver.  Otherwise no acute abnormality appreciated in the abdomen or pelvis.  Suspect abdominal wall strain.  We will prescribe diclofenac 50 mg by mouth 3 times daily with meals x7 days.  Recommend no frequent lifting, twisting, or bending.  Recommend return to the ER for recheck within 12 hours  if abdominal pain persists or worsens.  I updated patient and wife on all results.  They are appreciative and verbalized understanding.  Abdominal exam is benign and nonsurgical.    [FC]      ED Course User Index  [FC] Philly Lehman PA-C            Recent Results (from the past 24 hour(s))   Comprehensive Metabolic Panel    Collection Time: 08/25/21  6:08 PM    Specimen: Blood   Result Value Ref Range    Glucose 94 65 - 99 mg/dL    BUN 16 6 - 20 mg/dL    Creatinine 0.73 (L) 0.76 - 1.27 mg/dL    Sodium 139 136 - 145 mmol/L    Potassium 4.1 3.5 - 5.2 mmol/L    Chloride 104 98 - 107 mmol/L    CO2 24.0 22.0 - 29.0 mmol/L    Calcium 9.5 8.6 - 10.5 mg/dL    Total Protein 7.5 6.0 - 8.5 g/dL    Albumin 4.20 3.50 - 5.20 g/dL    ALT (SGPT) 29 1 - 41 U/L    AST (SGOT) 19 1 - 40 U/L    Alkaline Phosphatase 86 39 - 117 U/L    Total Bilirubin 0.3 0.0 - 1.2 mg/dL    eGFR Non African Amer 134 >60 mL/min/1.73    Globulin 3.3 gm/dL    A/G Ratio 1.3 g/dL    BUN/Creatinine Ratio 21.9 7.0 - 25.0    Anion Gap 11.0 5.0 - 15.0 mmol/L   Lipase    Collection Time: 08/25/21  6:08 PM    Specimen: Blood   Result Value Ref Range    Lipase 26 13 - 60 U/L   Green Top (Gel)    Collection Time: 08/25/21  6:08 PM   Result Value Ref Range    Extra Tube Hold for add-ons.    Lavender Top    Collection Time: 08/25/21  6:08 PM   Result Value Ref Range    Extra Tube hold for add-on    Gold Top - SST    Collection Time: 08/25/21  6:08 PM   Result Value Ref Range    Extra Tube Hold for add-ons.    Gray Top    Collection Time: 08/25/21  6:08 PM   Result Value Ref Range    Extra Tube Hold for add-ons.    CBC Auto Differential    Collection Time: 08/25/21  6:08 PM    Specimen: Blood   Result Value Ref Range    WBC 11.04 (H) 3.40 - 10.80 10*3/mm3    RBC 4.97 4.14 - 5.80 10*6/mm3    Hemoglobin 14.5 13.0 - 17.7 g/dL    Hematocrit 43.2 37.5 - 51.0 %    MCV 86.9 79.0 - 97.0 fL    MCH 29.2 26.6 - 33.0 pg    MCHC 33.6 31.5 - 35.7 g/dL    RDW 13.1 12.3 - 15.4 %     RDW-SD 41.0 37.0 - 54.0 fl    MPV 9.4 6.0 - 12.0 fL    Platelets 395 140 - 450 10*3/mm3    Neutrophil % 56.8 42.7 - 76.0 %    Lymphocyte % 31.4 19.6 - 45.3 %    Monocyte % 8.0 5.0 - 12.0 %    Eosinophil % 2.9 0.3 - 6.2 %    Basophil % 0.6 0.0 - 1.5 %    Immature Grans % 0.3 0.0 - 0.5 %    Neutrophils, Absolute 6.27 1.70 - 7.00 10*3/mm3    Lymphocytes, Absolute 3.47 (H) 0.70 - 3.10 10*3/mm3    Monocytes, Absolute 0.88 0.10 - 0.90 10*3/mm3    Eosinophils, Absolute 0.32 0.00 - 0.40 10*3/mm3    Basophils, Absolute 0.07 0.00 - 0.20 10*3/mm3    Immature Grans, Absolute 0.03 0.00 - 0.05 10*3/mm3    nRBC 0.0 0.0 - 0.2 /100 WBC   Urinalysis With Microscopic If Indicated (No Culture) - Urine, Clean Catch    Collection Time: 08/25/21  6:45 PM    Specimen: Urine, Clean Catch   Result Value Ref Range    Color, UA Yellow Yellow, Straw    Appearance, UA Clear Clear    pH, UA 5.5 5.0 - 8.0    Specific Gravity, UA 1.033 (H) 1.001 - 1.030    Glucose, UA Negative Negative    Ketones, UA Trace (A) Negative    Bilirubin, UA Negative Negative    Blood, UA Negative Negative    Protein, UA Negative Negative    Leuk Esterase, UA Negative Negative    Nitrite, UA Negative Negative    Urobilinogen, UA 1.0 E.U./dL 0.2 - 1.0 E.U./dL     Note: In addition to lab results from this visit, the labs listed above may include labs taken at another facility or during a different encounter within the last 24 hours. Please correlate lab times with ED admission and discharge times for further clarification of the services performed during this visit.    CT Abdomen Pelvis With Contrast   Final Result      1. Moderate diffuse fatty infiltration of liver.   2. Otherwise no acute abnormality appreciated in the abdomen or pelvis.            Signer Name: Elena Castro MD    Signed: 8/25/2021 8:34 PM    Workstation Name: [email or web address]     [facility]        Vitals:    08/25/21 1900 08/25/21 2000 08/25/21 2045 08/25/21 2212   BP: 137/80 154/95      BP Location: Right arm Right arm     Patient Position:  Lying     Pulse: 87 85     Resp: 16 18     Temp:       TempSrc:       SpO2: 97% 99% 96% 98%   Weight:       Height:         Medications   Sodium Chloride (PF) 0.9 % 10 mL (has no administration in time range)   sodium chloride 0.9 % bolus 1,000 mL (0 mL Intravenous Stopped 8/25/21 2058)   morphine injection 2 mg (2 mg Intravenous Given 8/25/21 1958)   ondansetron (ZOFRAN) injection 4 mg (4 mg Intravenous Given 8/25/21 1959)   iopamidol (ISOVUE-300) 61 % injection 100 mL (100 mL Intravenous Given 8/25/21 2016)     ECG/EMG Results (last 24 hours)     ** No results found for the last 24 hours. **        No orders to display                                         MDM    Final diagnoses:   Left sided abdominal pain   Strain of abdominal wall, initial encounter   Injury caused by lifting, initial encounter   History of obesity   Hepatic steatosis       ED Disposition  ED Disposition     ED Disposition Condition Comment    Discharge Stable           Carlos Haywood MD  [address] [facility] [facility] [address] [phone]    Schedule an appointment as soon as possible for a visit in 2 days  Close follow-up within 48 hours for recheck    [facility] Emergency Department  [address] [address] [phone]    If symptoms worsen         Medication List      New Prescriptions    diclofenac 50 MG EC tablet  Commonly known as: VOLTAREN  Take 1 tablet by mouth 3 (Three) Times a Day.        Stop    phentermine 37.5 MG tablet  Commonly known as: ADIPEX-P           Where to Get Your Medications      These medications were sent to [id number] - [place], [place] - [address] [phone]  - [phone]   [address] [address]    Phone: [phone]   · diclofenac 50 MG EC tablet          Philly Lehman PA-C  08/25/21 4797

## 2021-08-26 NOTE — DISCHARGE INSTRUCTIONS
ER evaluation reveals normal CBC, chemistries, and normal urinalysis.  CT of the abdomen/pelvis with contrast reveals fatty deposits on the liver but no other acute infectious or inflammatory process.  Suspect abdominal wall strain from recent heavy lifting.  Rx for diclofenac 50 mg by mouth 3 times daily with meals as needed for pain/inflammation.  Recommend no frequent twisting, bending, or any lifting greater than 10 pounds.  Return to the ER within 12 hours for recheck if abdominal pain persists or worsens.  Also recommend close PCP follow-up for recheck within 48 hours.

## 2023-08-23 ENCOUNTER — TELEMEDICINE (OUTPATIENT)
Dept: FAMILY MEDICINE CLINIC | Facility: CLINIC | Age: 23
End: 2023-08-23
Payer: COMMERCIAL

## 2023-08-23 DIAGNOSIS — G47.33 OSA (OBSTRUCTIVE SLEEP APNEA): Primary | ICD-10-CM

## 2023-08-23 PROCEDURE — 99213 OFFICE O/P EST LOW 20 MIN: CPT | Performed by: PHYSICIAN ASSISTANT

## 2023-08-23 NOTE — PROGRESS NOTES
Subjective   Daryn Haywood is a 23 y.o. male.   You have chosen to receive care through a telehealth visit.  Do you consent to use a video/audio connection for your medical care today? 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    Objective   Physical Exam  Constitutional:       Appearance: Normal appearance.   Pulmonary:      Effort: Pulmonary effort is normal.   Neurological:      Mental Status: He is alert and oriented to person, place, and time.   Psychiatric:         Mood and Affect: Mood normal.         Behavior: Behavior normal.       Assessment & Plan   Diagnoses and all orders for this visit:    1. KATYA (obstructive sleep apnea) (Primary)  -     Ambulatory Referral to Sleep Medicine    Will refer to sleep medicine for repeat sleep study. For now, remain adherent for CDL qualifications   This visit has been rescheduled as a video visit to comply with patient safety concerns in accordance with CDC recommendations. Total time of discussion was 5 minutes.

## 2023-10-03 ENCOUNTER — OFFICE VISIT (OUTPATIENT)
Dept: FAMILY MEDICINE CLINIC | Facility: CLINIC | Age: 23
End: 2023-10-03
Payer: COMMERCIAL

## 2023-10-03 VITALS
SYSTOLIC BLOOD PRESSURE: 124 MMHG | HEIGHT: 72 IN | RESPIRATION RATE: 18 BRPM | OXYGEN SATURATION: 98 % | BODY MASS INDEX: 42.66 KG/M2 | DIASTOLIC BLOOD PRESSURE: 72 MMHG | TEMPERATURE: 98.2 F | HEART RATE: 89 BPM | WEIGHT: 315 LBS

## 2023-10-03 DIAGNOSIS — B34.9 VIRAL SYNDROME: ICD-10-CM

## 2023-10-03 DIAGNOSIS — R50.9 FEVER, UNSPECIFIED FEVER CAUSE: Primary | ICD-10-CM

## 2023-10-03 DIAGNOSIS — R11.2 NAUSEA AND VOMITING, UNSPECIFIED VOMITING TYPE: ICD-10-CM

## 2023-10-03 LAB
EXPIRATION DATE: NORMAL
FLUAV AG UPPER RESP QL IA.RAPID: NOT DETECTED
FLUBV AG UPPER RESP QL IA.RAPID: NOT DETECTED
INTERNAL CONTROL: NORMAL
Lab: NORMAL
SARS-COV-2 AG UPPER RESP QL IA.RAPID: NOT DETECTED

## 2023-10-03 PROCEDURE — 99213 OFFICE O/P EST LOW 20 MIN: CPT | Performed by: FAMILY MEDICINE

## 2023-10-03 PROCEDURE — 87428 SARSCOV & INF VIR A&B AG IA: CPT | Performed by: FAMILY MEDICINE

## 2023-10-03 NOTE — PROGRESS NOTES
"Chief Complaint  Shortness of Breath (SOB, vomiting, fever on first day 101, no chills, slight cough)    Subjective          Daryn Haywood presents to [facility]    Daryn Haywood is a 23-year-old male who presents today for evaluation of fever, nausea, and vomiting.    Fever, nausea, and vomiting  He has been feeling clammy since Sunday afternoon, 10/02/2023. He states that he had a temperature of 101 degrees Fahrenheit yesterday, 10/02/2023. He got the fever down last night, 10/02/2023, to where it is supposed to be. Ever since the elevated temperature, he has been clammy and has not been able to keep anything down. He denies being around anyone that was sick. He felt hot and his wife checked his temperature and it was 101 degrees Fahrenheit. It was the highest it ever got. He denies having a sore throat, diarrhea, headache, vomiting today, and a bad cough. He has been really nauseated. He states that he has been vomiting.  He states that he has not ate anything today. He states that his calves really hurt. He denies having. He denies being around anyone that was sick. He states that he has had the first COVID-19 vaccinations. He denies ever having COVID-19 before. He denies doing a test for COVID-19 at home. He denies ear pain. He had tubes put in his ears a while back. He states that he feels about the same today than yesterday. He denies congestion in his head.          Review of Systems   Constitutional:  Positive for fatigue and fever.   HENT:  Positive for congestion and rhinorrhea.    Respiratory:  Positive for shortness of breath.    Cardiovascular: Negative.    Gastrointestinal:  Positive for nausea and vomiting. Negative for diarrhea.      Objective       Vital Signs:   /72   Pulse 89   Temp 98.2 °F (36.8 °C)   Resp 18   Ht 182.9 cm (72\")   Wt (!) 160 kg (353 lb)   SpO2 98%   BMI 47.88 kg/m²     Physical Exam  Vitals and nursing note reviewed.   Constitutional:     "   General: He is not in acute distress.     Appearance: Normal appearance. He is well-developed. He is not ill-appearing.   HENT:      Head: Normocephalic and atraumatic.      Right Ear: Ear canal and external ear normal. Tympanic membrane is scarred. Tympanic membrane is not erythematous or bulging.      Left Ear: Ear canal and external ear normal. Tympanic membrane is scarred. Tympanic membrane is not erythematous or bulging.   Eyes:      Pupils: Pupils are equal, round, and reactive to light.   Cardiovascular:      Rate and Rhythm: Normal rate and regular rhythm.      Heart sounds: Normal heart sounds.   Pulmonary:      Effort: Pulmonary effort is normal. No respiratory distress.      Breath sounds: Normal breath sounds. No wheezing or rales.   Abdominal:      Tenderness: There is abdominal tenderness (mild RUQ). There is no guarding or rebound.   Skin:     General: Skin is warm and dry.   Neurological:      Mental Status: He is alert and oriented to person, place, and time.   Psychiatric:         Behavior: Behavior normal.        Result Review :                     Assessment and Plan    Diagnoses and all orders for this visit:    1. Fever, unspecified fever cause (Primary)  -     POCT SARS-CoV-2 Antigen STEVAN + Flu    2. Nausea and vomiting, unspecified vomiting type  -     POCT SARS-CoV-2 Antigen STEVAN + Flu          DISCUSSION    Constellation of symptoms including fever, nausea, and vomiting.  - We will check a COVID-19 and flu testing here in the office.   - Further plan once that is reviewed.    COVID flu testing was negative.  Probably viral illness.  Increase fluids and rest.  Call if not improving or sooner if worsening.  May need to retest for COVID if not getting better in the next 2 to 3 days.        Follow Up   Return if symptoms worsen or fail to improve.    Patient was given instructions and counseling regarding his condition or for health maintenance advice. Please see specific information pulled  into the AVS if appropriate.       Carlos Haywood MD      Transcribed from ambient dictation for Carlos Haywood MD by Son Salinas.  10/03/23   16:35 EDT    Patient or patient representative verbalized consent to the visit recording.  I have personally performed the services described in this document as transcribed by the above individual, and it is both accurate and complete.  Carlos Haywood MD  10/3/2023  18:02 EDT

## 2024-03-08 ENCOUNTER — OFFICE VISIT (OUTPATIENT)
Dept: FAMILY MEDICINE CLINIC | Facility: CLINIC | Age: 24
End: 2024-03-08
Payer: COMMERCIAL

## 2024-03-08 VITALS
OXYGEN SATURATION: 95 % | HEART RATE: 84 BPM | SYSTOLIC BLOOD PRESSURE: 132 MMHG | RESPIRATION RATE: 14 BRPM | TEMPERATURE: 98.4 F | WEIGHT: 315 LBS | HEIGHT: 72 IN | BODY MASS INDEX: 42.66 KG/M2 | DIASTOLIC BLOOD PRESSURE: 82 MMHG

## 2024-03-08 DIAGNOSIS — E66.01 CLASS 3 SEVERE OBESITY WITHOUT SERIOUS COMORBIDITY WITH BODY MASS INDEX (BMI) OF 45.0 TO 49.9 IN ADULT, UNSPECIFIED OBESITY TYPE: Primary | ICD-10-CM

## 2024-03-08 DIAGNOSIS — Z13.220 SCREENING CHOLESTEROL LEVEL: ICD-10-CM

## 2024-03-08 DIAGNOSIS — Z13.29 SCREENING FOR ENDOCRINE DISORDER: ICD-10-CM

## 2024-03-08 PROCEDURE — 99214 OFFICE O/P EST MOD 30 MIN: CPT | Performed by: NURSE PRACTITIONER

## 2024-03-08 NOTE — PROGRESS NOTES
"  Date: 2024   Patient Name: Daryn Haywood  : 2000   MRN: [id number]     Chief Complaint:    Chief Complaint   Patient presents with    discuss weight loss       History of Present Illness: Daryn Haywood is a 23 y.o. male who is here today to follow up for Possible weight loss medication.  Patient is interested in starting a weight loss medication.  He has been trying to monitor his diet, improve lifestyle changes but is unable to lose any weight.  He has been watching his portion sizes and increasing his water intake.  He has seen a dietitian in the past and he is trying to incorporate healthy plate.  He does not do any exercising at this time.  He has not had any up-to-date blood work.  No family history or personal history of pancreatitis.       Review of Systems:   Review of Systems   Constitutional:  Positive for unexpected weight gain.       I have reviewed the patients family history, social history, past medical history, past surgical history and have updated it as appropriate.     Medications:   No current outpatient medications on file.    Allergies:   No Known Allergies    PHQ-9 Total Score:       Physical Exam:  Vital Signs:   Vitals:    24 1624   BP: 132/82   Pulse: 84   Resp: 14   Temp: 98.4 °F (36.9 °C)   SpO2: 95%   Weight: (!) 165 kg (364 lb 12.8 oz)   Height: 182.9 cm (72\")     Body mass index is 49.48 kg/m².   Class 3 Severe Obesity (BMI >=40). Obesity-related health conditions include the following: none. Obesity is improving with lifestyle modifications. BMI is is above average; BMI management plan is completed. We discussed low calorie, low carb based diet program, portion control, and increasing exercise.       Physical Exam  Vitals and nursing note reviewed.   Constitutional:       Appearance: Normal appearance.   HENT:      Head: Normocephalic and atraumatic.   Neck:      Thyroid: No thyromegaly.   Cardiovascular:      Rate and Rhythm: Normal rate and regular rhythm. "   Pulmonary:      Effort: Pulmonary effort is normal.      Breath sounds: Normal breath sounds.   Abdominal:      General: Bowel sounds are normal.   Musculoskeletal:      Cervical back: Normal.      Thoracic back: Normal.      Lumbar back: No tenderness. Normal range of motion.   Lymphadenopathy:      Cervical: No cervical adenopathy.   Skin:     General: Skin is warm.   Neurological:      General: No focal deficit present.      Mental Status: He is alert and oriented to person, place, and time.   Psychiatric:         Mood and Affect: Mood normal.           Assessment/Plan:   Diagnoses and all orders for this visit:    1. Class 3 severe obesity without serious comorbidity with body mass index (BMI) of 45.0 to 49.9 in adult, unspecified obesity type (Primary)  -     Comprehensive Metabolic Panel  -     CBC Auto Differential  -     Hemoglobin A1c    2. Screening for endocrine disorder  -     T4, Free  -     TSH    3. Screening cholesterol level  -     Lipid panel       Educated on the importance of diet management along with medication management in the weight loss journey  Increase water intake   Decrease carbs, sugars, fatty foods,  Increase water intake  Monitor portion sizes  Increase exercising 30-45 mins a day 3-4 times a week.    Educated that we will complete labs before adding on a medication.    Follow Up:   Return for Next scheduled follow up.      [place]. RONNI TOBAR [facility]

## 2024-03-11 ENCOUNTER — LAB (OUTPATIENT)
Dept: FAMILY MEDICINE CLINIC | Facility: CLINIC | Age: 24
End: 2024-03-11
Payer: COMMERCIAL

## 2024-03-12 LAB
ALBUMIN SERPL-MCNC: 4.4 G/DL (ref 4.3–5.2)
ALBUMIN/GLOB SERPL: 1.5 {RATIO} (ref 1.2–2.2)
ALP SERPL-CCNC: 82 IU/L (ref 44–121)
ALT SERPL-CCNC: 38 IU/L (ref 0–44)
AST SERPL-CCNC: 23 IU/L (ref 0–40)
BASOPHILS # BLD AUTO: 0.1 X10E3/UL (ref 0–0.2)
BASOPHILS NFR BLD AUTO: 1 %
BILIRUB SERPL-MCNC: 0.3 MG/DL (ref 0–1.2)
BUN SERPL-MCNC: 15 MG/DL (ref 6–20)
BUN/CREAT SERPL: 19 (ref 9–20)
CALCIUM SERPL-MCNC: 9.7 MG/DL (ref 8.7–10.2)
CHLORIDE SERPL-SCNC: 105 MMOL/L (ref 96–106)
CHOLEST SERPL-MCNC: 159 MG/DL (ref 100–199)
CO2 SERPL-SCNC: 23 MMOL/L (ref 20–29)
CREAT SERPL-MCNC: 0.8 MG/DL (ref 0.76–1.27)
EGFRCR SERPLBLD CKD-EPI 2021: 127 ML/MIN/1.73
EOSINOPHIL # BLD AUTO: 0.1 X10E3/UL (ref 0–0.4)
EOSINOPHIL NFR BLD AUTO: 2 %
ERYTHROCYTE [DISTWIDTH] IN BLOOD BY AUTOMATED COUNT: 13 % (ref 11.6–15.4)
GLOBULIN SER CALC-MCNC: 3 G/DL (ref 1.5–4.5)
GLUCOSE SERPL-MCNC: 99 MG/DL (ref 70–99)
HBA1C MFR BLD: 5.9 % (ref 4.8–5.6)
HCT VFR BLD AUTO: 43.9 % (ref 37.5–51)
HDLC SERPL-MCNC: 40 MG/DL
HGB BLD-MCNC: 14.6 G/DL (ref 13–17.7)
IMM GRANULOCYTES # BLD AUTO: 0 X10E3/UL (ref 0–0.1)
IMM GRANULOCYTES NFR BLD AUTO: 0 %
LDLC SERPL CALC-MCNC: 104 MG/DL (ref 0–99)
LYMPHOCYTES # BLD AUTO: 2.5 X10E3/UL (ref 0.7–3.1)
LYMPHOCYTES NFR BLD AUTO: 33 %
MCH RBC QN AUTO: 29.3 PG (ref 26.6–33)
MCHC RBC AUTO-ENTMCNC: 33.3 G/DL (ref 31.5–35.7)
MCV RBC AUTO: 88 FL (ref 79–97)
MONOCYTES # BLD AUTO: 0.6 X10E3/UL (ref 0.1–0.9)
MONOCYTES NFR BLD AUTO: 9 %
NEUTROPHILS # BLD AUTO: 4.1 X10E3/UL (ref 1.4–7)
NEUTROPHILS NFR BLD AUTO: 55 %
PLATELET # BLD AUTO: 363 X10E3/UL (ref 150–450)
POTASSIUM SERPL-SCNC: 4.6 MMOL/L (ref 3.5–5.2)
PROT SERPL-MCNC: 7.4 G/DL (ref 6–8.5)
RBC # BLD AUTO: 4.98 X10E6/UL (ref 4.14–5.8)
SODIUM SERPL-SCNC: 140 MMOL/L (ref 134–144)
T4 FREE SERPL-MCNC: 1 NG/DL (ref 0.82–1.77)
TRIGL SERPL-MCNC: 80 MG/DL (ref 0–149)
TSH SERPL DL<=0.005 MIU/L-ACNC: 2.21 UIU/ML (ref 0.45–4.5)
VLDLC SERPL CALC-MCNC: 15 MG/DL (ref 5–40)
WBC # BLD AUTO: 7.4 X10E3/UL (ref 3.4–10.8)

## 2024-04-29 ENCOUNTER — OFFICE VISIT (OUTPATIENT)
Dept: FAMILY MEDICINE CLINIC | Facility: CLINIC | Age: 24
End: 2024-04-29
Payer: COMMERCIAL

## 2024-04-29 ENCOUNTER — TELEPHONE (OUTPATIENT)
Dept: FAMILY MEDICINE CLINIC | Facility: CLINIC | Age: 24
End: 2024-04-29

## 2024-04-29 VITALS
WEIGHT: 315 LBS | TEMPERATURE: 98.1 F | OXYGEN SATURATION: 98 % | HEIGHT: 72 IN | SYSTOLIC BLOOD PRESSURE: 128 MMHG | HEART RATE: 62 BPM | RESPIRATION RATE: 14 BRPM | DIASTOLIC BLOOD PRESSURE: 82 MMHG | BODY MASS INDEX: 42.66 KG/M2

## 2024-04-29 DIAGNOSIS — R73.03 PREDIABETES: ICD-10-CM

## 2024-04-29 DIAGNOSIS — Z00.00 WELL ADULT EXAM: Primary | ICD-10-CM

## 2024-04-29 DIAGNOSIS — E66.01 CLASS 3 SEVERE OBESITY DUE TO EXCESS CALORIES WITHOUT SERIOUS COMORBIDITY WITH BODY MASS INDEX (BMI) OF 50.0 TO 59.9 IN ADULT: Primary | ICD-10-CM

## 2024-04-29 DIAGNOSIS — E66.01 CLASS 3 SEVERE OBESITY DUE TO EXCESS CALORIES WITHOUT SERIOUS COMORBIDITY WITH BODY MASS INDEX (BMI) OF 50.0 TO 59.9 IN ADULT: ICD-10-CM

## 2024-04-29 PROCEDURE — 99395 PREV VISIT EST AGE 18-39: CPT | Performed by: FAMILY MEDICINE

## 2024-04-29 RX ORDER — SEMAGLUTIDE 0.25 MG/.5ML
0.25 INJECTION, SOLUTION SUBCUTANEOUS WEEKLY
Qty: 2 ML | Refills: 0 | Status: SHIPPED | OUTPATIENT
Start: 2024-04-29

## 2024-04-29 RX ORDER — CHLORHEXIDINE GLUCONATE ORAL RINSE 1.2 MG/ML
SOLUTION DENTAL
COMMUNITY
Start: 2024-04-25

## 2024-04-29 NOTE — PROGRESS NOTES
"Chief Complaint  well exam    Subjective          Daryn Haywood presents to [facility]    History of Present Illness  The patient presents for a well examination.    The patient reports a perceived weight loss through dietary modifications, including eliminating soda and tea from his diet. His daily water intake is between 0.5 to 1 gallon, and he engages in a 4-mile walk with his wife post-work. He has a history of weight loss medication, the name of which he can not recall, but discontinued due to excessive dryness. His mother has suggested the use of Wegovy, a treatment option he and his father are on weekly injections. He denies a family history of thyroid cancer or pancreatitis, and admits to occasional overeating. His dental health is managed by Dr. Power Haywood, and his last eye examination was conducted in 2023. He consistently uses his seatbelt and declines further COVID-19 vaccinations. He received a tetanus vaccination approximately 5 years ago due to a metal cut in his hand.   He does not smoke anymore. He drinks alcohol 1 or 2 drinks in a month. He does not use drugs. He has 1 child.   He is not aware of any family history of thyroid cancer or multiple endocrine neoplasia. His parents are living.        OTHER NOTES:        Review of Systems   Constitutional: Negative.    HENT: Negative.     Respiratory: Negative.     Cardiovascular: Negative.    Gastrointestinal: Negative.    All other systems reviewed and are negative.       Objective       Vital Signs:   /82   Pulse 62   Temp 98.1 °F (36.7 °C)   Resp 14   Ht 182.9 cm (72\")   Wt (!) 171 kg (377 lb)   SpO2 98%   BMI 51.13 kg/m²     Physical Exam  Vitals and nursing note reviewed.   Constitutional:       General: He is not in acute distress.     Appearance: Normal appearance. He is well-developed. He is obese. He is not ill-appearing.   HENT:      Head: Normocephalic and atraumatic.      Right Ear: Hearing, " tympanic membrane, ear canal and external ear normal.      Left Ear: Hearing, tympanic membrane, ear canal and external ear normal.      Nose: Nose normal. No congestion or rhinorrhea.      Mouth/Throat:      Mouth: Mucous membranes are moist.      Pharynx: No oropharyngeal exudate or posterior oropharyngeal erythema.   Eyes:      General: Lids are normal.      Conjunctiva/sclera: Conjunctivae normal.      Pupils: Pupils are equal, round, and reactive to light.   Neck:      Thyroid: No thyromegaly.   Cardiovascular:      Rate and Rhythm: Normal rate and regular rhythm.      Heart sounds: Normal heart sounds. No murmur heard.     No friction rub.   Pulmonary:      Effort: Pulmonary effort is normal. No respiratory distress.      Breath sounds: Normal breath sounds. No wheezing or rales.   Abdominal:      General: Bowel sounds are normal. There is no distension.      Palpations: Abdomen is soft. There is no mass.      Tenderness: There is no abdominal tenderness. There is no guarding or rebound.   Musculoskeletal:      Right lower leg: No edema.      Left lower leg: No edema.   Skin:     General: Skin is warm and dry.   Neurological:      General: No focal deficit present.      Mental Status: He is alert.   Psychiatric:         Mood and Affect: Mood normal.         Speech: Speech normal.         Behavior: Behavior normal.            Physical Exam      Result Review :            Other Results    Results  Laboratory Studies  A1c was 5.9.             Assessment and Plan    Diagnoses and all orders for this visit:    1. Well adult exam (Primary)    2. Class 3 severe obesity due to excess calories without serious comorbidity with body mass index (BMI) of 50.0 to 59.9 in adult  -     Semaglutide-Weight Management (Wegovy) 0.25 MG/0.5ML solution auto-injector; Inject 0.5 mL under the skin into the appropriate area as directed 1 (One) Time Per Week.  Dispense: 2 mL; Refill: 0    3. Prediabetes  -     Insulin, Total                DISCUSSION    Assessment & Plan  1. Health maintenance.  The patient is advised to persist with routine health maintenance, including regular dentistry and eye examinations. Safety measures such as seatbelt use, regular exercise, and a healthy diet are strongly recommended. His immunization status is current, however, he has expressed disinterest in further COVID-19 vaccinations.    2. Obesity.  The patient has previously attempted phentermine treatment, but experienced adverse effects. He has expressed interest in exploring weight loss injections. He denies any personal or family history of thyroid cancer, and there has been no instances of pancreatitis or multiple endocrine neoplasia. I have informed him that his [facility] will no longer be covering this treatment within the next few months, but I am optimistic that we can initiate treatment for  a few months. We will initiate a regimen of Wegovy, administered at a dosage of 0.25 mg once weekly. The potential side effects, including nausea, have been thoroughly explained.     Given his prediabetic status, an insulin level test will be conducted. If his insulin levels are elevated, we will recommend initiating metformin therapy, which can assist with weight loss and reduce the risk of developing diabetes. His previous blood work, conducted in 03/2024, was reviewed, including cholesterol, which was satisfactory. His A1c was 5.9. Further plans will be made once his insulin levels are available, and when he can commence Wegovy.        Follow Up   Return for follow up depends on review of labs and testing.    Patient was given instructions and counseling regarding his condition or for health maintenance advice. Please see specific information pulled into the AVS if appropriate.       Carlos Haywood MD    Patient or patient representative verbalized consent for the use of Ambient Listening during the visit with  Carlos Haywood MD for chart  documentation. 4/29/2024  09:40 EDT

## 2024-04-29 NOTE — TELEPHONE ENCOUNTER
PATIENT HAS CALLED AND STATED PRESCRIPTION FOR   Semaglutide-Weight Management (Wegovy) 0.25 MG/0.5ML solution auto-injector  IS REQUIRING A PRIOR AUTHORIZATION.    CALL BACK NUMBER [phone]

## 2024-04-30 DIAGNOSIS — E88.819 INSULIN RESISTANCE: Primary | ICD-10-CM

## 2024-04-30 LAB — INSULIN SERPL-ACNC: 34.9 UIU/ML (ref 2.6–24.9)

## 2024-04-30 RX ORDER — METFORMIN HYDROCHLORIDE 500 MG/1
500 TABLET, EXTENDED RELEASE ORAL
Qty: 30 TABLET | Refills: 3 | Status: SHIPPED | OUTPATIENT
Start: 2024-04-30

## 2024-05-08 NOTE — TELEPHONE ENCOUNTER
PA Case: [id number], Status: Denied, Denial Rationale: The requested medication is excluded as a plan benefit.

## 2024-05-09 NOTE — TELEPHONE ENCOUNTER
Called informed pt. Asked if there is any other options. Would he be a candidate for phentermine?

## 2024-05-09 NOTE — TELEPHONE ENCOUNTER
Please call patient and let him know that the Wegovy was denied because his insurance will not cover weight loss medication any longer.

## 2024-05-09 NOTE — TELEPHONE ENCOUNTER
Please call.  If he wants to try that, okay but please let him know that phentermine can cause increased blood pressure, increased blood pressure inside the lungs, heart arrhythmias, heart valve issues.  He would have to be seen in the office 1 month after starting the medication.    He can only take it for 3 to 6 months.    Let me know if you like to try that and then can get him set up for follow-up.

## 2024-05-13 RX ORDER — PHENTERMINE HYDROCHLORIDE 37.5 MG/1
37.5 TABLET ORAL
Qty: 30 TABLET | Refills: 0 | Status: SHIPPED | OUTPATIENT
Start: 2024-05-13

## 2024-05-13 NOTE — TELEPHONE ENCOUNTER
Phentermine sent to pharmacy.  Appointment scheduled for June 7, 2024.    Fawad dated 5/13/2024 was reviewed and appropriate.

## 2024-05-16 DIAGNOSIS — E66.01 CLASS 3 SEVERE OBESITY DUE TO EXCESS CALORIES WITHOUT SERIOUS COMORBIDITY WITH BODY MASS INDEX (BMI) OF 50.0 TO 59.9 IN ADULT: ICD-10-CM

## 2024-05-16 RX ORDER — PHENTERMINE HYDROCHLORIDE 37.5 MG/1
37.5 TABLET ORAL
Qty: 30 TABLET | Refills: 0 | OUTPATIENT
Start: 2024-05-16

## 2024-06-07 ENCOUNTER — TELEPHONE (OUTPATIENT)
Dept: FAMILY MEDICINE CLINIC | Facility: CLINIC | Age: 24
End: 2024-06-07

## 2024-06-07 DIAGNOSIS — E66.01 CLASS 3 SEVERE OBESITY DUE TO EXCESS CALORIES WITHOUT SERIOUS COMORBIDITY WITH BODY MASS INDEX (BMI) OF 50.0 TO 59.9 IN ADULT: ICD-10-CM

## 2024-06-07 RX ORDER — PHENTERMINE HYDROCHLORIDE 37.5 MG/1
37.5 TABLET ORAL
Qty: 30 TABLET | Refills: 0 | Status: SHIPPED | OUTPATIENT
Start: 2024-06-07

## 2024-06-07 NOTE — TELEPHONE ENCOUNTER
Please call, requested meds sent to pharmacy.             ========================  Fawad reviewed 6/7/2024 . Follow up appt is scheduled on 7/8/2024 .    Last office visit with me : 4/29/2024

## 2024-06-07 NOTE — TELEPHONE ENCOUNTER
Daryn had a family emergency and he had to reschedule his appointment.  He wanted to know if he could have a refill to get to the next appt in July. He stated that he has been watching his bp and it has not been running high and so far he has lost 20 lbs so far!

## 2024-06-27 ENCOUNTER — TELEPHONE (OUTPATIENT)
Dept: FAMILY MEDICINE CLINIC | Facility: CLINIC | Age: 24
End: 2024-06-27
Payer: COMMERCIAL

## 2024-06-27 DIAGNOSIS — E66.01 CLASS 3 SEVERE OBESITY DUE TO EXCESS CALORIES WITHOUT SERIOUS COMORBIDITY WITH BODY MASS INDEX (BMI) OF 50.0 TO 59.9 IN ADULT: ICD-10-CM

## 2024-07-01 RX ORDER — PHENTERMINE HYDROCHLORIDE 37.5 MG/1
37.5 TABLET ORAL
Qty: 7 TABLET | Refills: 0 | Status: SHIPPED | OUTPATIENT
Start: 2024-07-01 | End: 2024-07-08 | Stop reason: SDUPTHER

## 2024-07-01 NOTE — TELEPHONE ENCOUNTER
Please call.  I have sent in a 1 week supply until he is seen on July 8.  He is past due for an appointment in regards to the follow-up on the phentermine.

## 2024-07-08 ENCOUNTER — OFFICE VISIT (OUTPATIENT)
Dept: FAMILY MEDICINE CLINIC | Facility: CLINIC | Age: 24
End: 2024-07-08
Payer: COMMERCIAL

## 2024-07-08 VITALS
HEART RATE: 82 BPM | RESPIRATION RATE: 18 BRPM | DIASTOLIC BLOOD PRESSURE: 70 MMHG | WEIGHT: 315 LBS | TEMPERATURE: 97.4 F | BODY MASS INDEX: 42.66 KG/M2 | SYSTOLIC BLOOD PRESSURE: 126 MMHG | HEIGHT: 72 IN

## 2024-07-08 DIAGNOSIS — E66.01 CLASS 3 SEVERE OBESITY DUE TO EXCESS CALORIES WITHOUT SERIOUS COMORBIDITY WITH BODY MASS INDEX (BMI) OF 45.0 TO 49.9 IN ADULT: Primary | ICD-10-CM

## 2024-07-08 PROCEDURE — 99213 OFFICE O/P EST LOW 20 MIN: CPT | Performed by: FAMILY MEDICINE

## 2024-07-08 RX ORDER — PHENTERMINE HYDROCHLORIDE 37.5 MG/1
37.5 TABLET ORAL
Qty: 30 TABLET | Refills: 1 | Status: SHIPPED | OUTPATIENT
Start: 2024-07-08

## 2024-07-08 NOTE — PROGRESS NOTES
"Chief Complaint  weight follow up     Subjective          Daryn Haywood presents to [facility]    History of Present Illness  The patient is a 24-year-old male who is here for follow-up. He is here for follow-up of weight. He is currently on phentermine 37.5 mg and metformin. His weight has decreased from 377 pounds to 364 pounds.    The patient reports a positive response to phentermine, which he believes has resulted in weight loss. He denies experiencing any chest pain, respiratory distress, or palpitations. He acknowledges a reduction in the swelling of his legs. He denies experiencing constipation, however, he does report xerostomia.        OTHER NOTES:        Review of Systems   Respiratory: Negative.     Cardiovascular: Negative.    Gastrointestinal: Negative.    All other systems reviewed and are negative.       Objective       Vital Signs:   /70   Pulse 82   Temp 97.4 °F (36.3 °C)   Resp 18   Ht 182.9 cm (72\")   Wt (!) 165 kg (364 lb)   BMI 49.37 kg/m²     Physical Exam  Vitals and nursing note reviewed.   Constitutional:       Appearance: He is well-developed. He is obese.   HENT:      Head: Normocephalic and atraumatic.      Right Ear: External ear normal.      Left Ear: External ear normal.   Eyes:      Pupils: Pupils are equal, round, and reactive to light.   Cardiovascular:      Rate and Rhythm: Normal rate and regular rhythm.      Heart sounds: Normal heart sounds.   Pulmonary:      Effort: Pulmonary effort is normal. No respiratory distress.      Breath sounds: Normal breath sounds. No wheezing or rales.   Musculoskeletal:      Right lower leg: No edema.      Left lower leg: No edema.   Skin:     General: Skin is warm and dry.   Neurological:      Mental Status: He is alert.   Psychiatric:         Behavior: Behavior normal.            Physical Exam  Vital Signs  Vitals show a blood pressure of 126/70.    Result Review :            Other " Results    Results               Assessment and Plan    Diagnoses and all orders for this visit:    1. Class 3 severe obesity due to excess calories without serious comorbidity with body mass index (BMI) of 45.0 to 49.9 in adult (Primary)  -     phentermine (ADIPEX-P) 37.5 MG tablet; Take 1 tablet by mouth Every Morning Before Breakfast.  Dispense: 30 tablet; Refill: 1               DISCUSSION    Assessment & Plan  1. Obesity  The patient is here for a follow-up consultation regarding obesity. He has been responding well to phentermine, with no reported side effects. The current phentermine regimen will be maintained daily. A follow-up appointment is scheduled for 2 months from now, or earlier should any issues arise. Dietary modifications and physical activity will be continued.    Follow-up  A follow-up appointment is scheduled for 2 months from now.    Fawad dated 7/8/2024  was reviewed and appropriate.     Follow Up   Return in about 2 months (around 9/8/2024).    Patient was given instructions and counseling regarding his condition or for health maintenance advice. Please see specific information pulled into the AVS if appropriate.       Carlos Haywood MD    Patient or patient representative verbalized consent for the use of Ambient Listening during the visit with  Carlos Haywood MD for chart documentation. 7/8/2024  18:22 EDT